# Patient Record
Sex: MALE | URBAN - METROPOLITAN AREA
[De-identification: names, ages, dates, MRNs, and addresses within clinical notes are randomized per-mention and may not be internally consistent; named-entity substitution may affect disease eponyms.]

---

## 2017-02-14 ENCOUNTER — OFFICE VISIT (OUTPATIENT)
Dept: FAMILY MEDICINE | Facility: CLINIC | Age: 42
End: 2017-02-14
Payer: COMMERCIAL

## 2017-02-14 VITALS
DIASTOLIC BLOOD PRESSURE: 69 MMHG | TEMPERATURE: 98.4 F | WEIGHT: 188 LBS | HEART RATE: 60 BPM | SYSTOLIC BLOOD PRESSURE: 110 MMHG | OXYGEN SATURATION: 96 % | HEIGHT: 66 IN | BODY MASS INDEX: 30.22 KG/M2

## 2017-02-14 DIAGNOSIS — S86.011A STRAIN OF RIGHT ACHILLES TENDON, INITIAL ENCOUNTER: Primary | ICD-10-CM

## 2017-02-14 DIAGNOSIS — Z23 NEED FOR PROPHYLACTIC VACCINATION AND INOCULATION AGAINST INFLUENZA: ICD-10-CM

## 2017-02-14 DIAGNOSIS — N52.8 OTHER MALE ERECTILE DYSFUNCTION: ICD-10-CM

## 2017-02-14 DIAGNOSIS — Z23 NEED FOR VACCINATION: ICD-10-CM

## 2017-02-14 PROCEDURE — 90471 IMMUNIZATION ADMIN: CPT | Performed by: FAMILY MEDICINE

## 2017-02-14 PROCEDURE — 90472 IMMUNIZATION ADMIN EACH ADD: CPT | Performed by: FAMILY MEDICINE

## 2017-02-14 PROCEDURE — 90715 TDAP VACCINE 7 YRS/> IM: CPT | Performed by: FAMILY MEDICINE

## 2017-02-14 PROCEDURE — 90686 IIV4 VACC NO PRSV 0.5 ML IM: CPT | Performed by: FAMILY MEDICINE

## 2017-02-14 PROCEDURE — 99214 OFFICE O/P EST MOD 30 MIN: CPT | Mod: 25 | Performed by: FAMILY MEDICINE

## 2017-02-14 RX ORDER — NABUMETONE 500 MG/1
500-1000 TABLET, FILM COATED ORAL 2 TIMES DAILY PRN
Qty: 30 TABLET | Refills: 0 | Status: SHIPPED | OUTPATIENT
Start: 2017-02-14 | End: 2017-02-20

## 2017-02-14 RX ORDER — TADALAFIL 5 MG/1
5 TABLET ORAL DAILY
Qty: 30 TABLET | Refills: 0 | Status: SHIPPED | OUTPATIENT
Start: 2017-02-14

## 2017-02-14 NOTE — PROGRESS NOTES
"  SUBJECTIVE:                                                    Bill Moyer is a 41 year old male who presents to clinic today for the following health issues:      Musculoskeletal problem/Foot pain (R)       Duration: 2 months     Description  Location: right heel/ankle. Patient is complaining of sharp shooting pain stemming from the bottom of the right heel all the way up to his lower leg. Comes on and off, although it is becoming more frequent and noticeable at the day after he is on his feet a lot     Intensity:  moderate    Accompanying signs and symptoms: radiation of pain to ankle and lower leg . No swelling redness etc     History  Previous similar problem: no   Previous evaluation:  none    Precipitating or alleviating factors:  Trauma or overuse: YES  Aggravating factors include: sitting    Therapies tried and outcome: NSAID - not effective    other concerns   Also wants to get refill on Viagra, since he is out. No problem taking med's. Although he thinsk does not work as good so wondering if he can try something different         Problem list and histories reviewed & adjusted, as indicated.  Additional history: as documented    Problem list, Medication list, Allergies, and Medical/Social/Surgical histories reviewed in EPIC and updated as appropriate.    ROS:  Constitutional, HEENT, cardiovascular, pulmonary, GI, , musculoskeletal, neuro, skin, endocrine and psych systems are negative, except as otherwise noted.    OBJECTIVE:                                                    /69 (BP Location: Right arm, Patient Position: Chair, Cuff Size: Adult Large)  Pulse 60  Temp 98.4  F (36.9  C) (Tympanic)  Ht 5' 5.75\" (1.67 m)  Wt 188 lb (85.3 kg)  SpO2 96%  BMI 30.58 kg/m2  Body mass index is 30.58 kg/(m^2).  GENERAL: healthy, alert and no distress  RESP: lungs clear to auscultation - no rales, rhonchi or wheezes  CV: regular rate and rhythm, normal S1 S2, no S3 or S4, no murmur  MS: rt foot " and ankle with no swelling and  normal range of motion, has  tenderness to palpation mostly distal achillis and bottom of his heel . No calf swelling or tenderness   NEURO: Normal strength and tone,          ASSESSMENT/PLAN:                                                      (S86.011A) Strain of right Achilles tendon, initial encounter  (primary encounter diagnosis)  Comment:   Plan: nabumetone (RELAFEN) 500 MG tablet            Discussed feet / ankle cares. Talked about comfortable shoes, orthotics to support etc.. Also gave Relafen to help with pain in the meantime. Pros/ cons of med's discussed follow up if ongoing problem. Consider PT or podiatry referral if needed. He will follow up as needed       (N52.8) Other male erectile dysfunction  Comment:   Plan: tadalafil (CIALIS) 5 MG tablet   he want to rty cilias. Cares and symptomatic treatment discussed follow up if problem         (Z23) Need for vaccination  Comment:   Plan: TDAP (ADACEL AGES 11-64) [90274.002], 1st          Administration  [74331]            (Z23) Need for prophylactic vaccination and inoculation against influenza  Comment:   Plan: FLU VAC, SPLIT VIRUS IM > 3 YO (QUADRIVALENT)         [81738], Each additional admin.  (Right click         and add QUANTITY)  [78813]            Patient expressed understanding and agreement with treatment plan. All patient's questions were answered, will let me know if has more later.  Medications: Rx's: Reviewed the potential side effects/complications of medications prescribed.         Merle Mark MD  [facility] [place]    Injectable Influenza Immunization Documentation    1.  Is the person to be vaccinated sick today?  No    2. Does the person to be vaccinated have an allergy to eggs or to a component of the vaccine?  No    3. Has the person to be vaccinated today ever had a serious reaction to influenza vaccine in the past?  No    4. Has the person to be vaccinated ever had Guillain-Wapiti  syndrome?  No     Form completed by Laurita Woo MA

## 2017-02-14 NOTE — NURSING NOTE
"Chief Complaint   Patient presents with     Musculoskeletal Problem     Right        Initial /69 (BP Location: Right arm, Patient Position: Chair, Cuff Size: Adult Large)  Pulse 60  Temp 98.4  F (36.9  C) (Tympanic)  Ht 5' 5.75\" (1.67 m)  Wt 188 lb (85.3 kg)  SpO2 96%  BMI 30.58 kg/m2 Estimated body mass index is 30.58 kg/(m^2) as calculated from the following:    Height as of this encounter: 5' 5.75\" (1.67 m).    Weight as of this encounter: 188 lb (85.3 kg).  Medication Reconciliation: complete  "

## 2017-02-14 NOTE — MR AVS SNAPSHOT
"              After Visit Summary   2/14/2017    Bill Moyer    MRN: [id number]           Patient Information     Date Of Birth          1975        Visit Information        Provider Department      2/14/2017 4:00 PM Merle Mark MD [facility] [place]        Today's Diagnoses     Strain of right Achilles tendon, initial encounter    -  1    Need for vaccination        Need for prophylactic vaccination and inoculation against influenza        Other male erectile dysfunction          Care Instructions    Take medications as directed.  Treatment  and symptomatic cares discussed   Follow up if problem or concern           Follow-ups after your visit        Follow-up notes from your care team     Return if symptoms worsen or fail to improve.      Who to contact     If you have questions or need follow up information about today's clinic visit or your schedule please contact [facility] [place] directly at [phone].  Normal or non-critical lab and imaging results will be communicated to you by Offerboardhart, letter or phone within 4 business days after the clinic has received the results. If you do not hear from us within 7 days, please contact the clinic through MyChart or phone. If you have a critical or abnormal lab result, we will notify you by phone as soon as possible.  Submit refill requests through [employer] or call your pharmacy and they will forward the refill request to us. Please allow 3 business days for your refill to be completed.          Additional Information About Your Visit        MyChart Information     [employer] lets you send messages to your doctor, view your test results, renew your prescriptions, schedule appointments and more. To sign up, go to [email or web address] . Click on \"Log in\" on the left side of the screen, which will take you to the Welcome page. Then click on \"Sign up Now\" on the right side of the page.     You will be asked to enter the " "access code listed below, as well as some personal information. Please follow the directions to create your username and password.     Your access code is: [email or web address]  Expires: 3/26/2017  5:00 PM     Your access code will  in 90 days. If you need help or a new code, please call your [place] clinic or [phone].        Care EveryWhere ID     This is your Care EveryWhere ID. This could be used by other organizations to access your [place] medical records  [id number]        Your Vitals Were     Pulse Temperature Height Pulse Oximetry BMI (Body Mass Index)       60 98.4  F (36.9  C) (Tympanic) 5' 5.75\" (1.67 m) 96% 30.58 kg/m2        Blood Pressure from Last 3 Encounters:   17 110/69   16 106/68   10/07/14 110/69    Weight from Last 3 Encounters:   17 188 lb (85.3 kg)   16 189 lb 9.6 oz (86 kg)   10/07/14 174 lb (78.9 kg)              We Performed the Following     1st  Administration  [80378]     Each additional admin.  (Right click and add QUANTITY)  [66320]     FLU VAC, SPLIT VIRUS IM > 3 YO (QUADRIVALENT) [49560]     TDAP (ADACEL AGES 11-64) [92452.002]          Today's Medication Changes          These changes are accurate as of: 17  4:56 PM.  If you have any questions, ask your nurse or doctor.               Start taking these medicines.        Dose/Directions    nabumetone 500 MG tablet   Commonly known as:  RELAFEN   Used for:  Strain of right Achilles tendon, initial encounter   Started by:  Merle Mark MD        Dose:  500-1000 mg   Take 1-2 tablets (500-1,000 mg) by mouth 2 times daily as needed   Quantity:  30 tablet   Refills:  0       tadalafil 5 MG tablet   Commonly known as:  CIALIS   Used for:  Other male erectile dysfunction   Started by:  Merle Mark MD        Dose:  5 mg   Take 1 tablet (5 mg) by mouth daily Never use with nitroglycerin, terazosin or doxazosin.   Quantity:  30 tablet   Refills:  0            Where to get your medicines    "   These medications were sent to [facility] [place] - [place], [place] - [address]  [address] [address] [facility], [place] [address]     Phone:  [phone]     nabumetone 500 MG tablet    tadalafil 5 MG tablet                Primary Care Provider    None Specified       No primary provider on file.        Thank you!     [facility] you [facility]. Our goal is always to provide you with excellent care. Hearing back from our patients is one way we can continue to improve our services. Please take a few minutes to complete the written survey that you may receive in the mail after your visit with us. Thank you!             Your Updated Medication List - Protect others around you: Learn how to safely use, store and throw away your medicines at www.disposemymeds.org.          This list is accurate as of: 2/14/17  4:56 PM.  Always use your most recent med list.                   Brand Name Dispense Instructions for use    nabumetone 500 MG tablet    RELAFEN    30 tablet    Take 1-2 tablets (500-1,000 mg) by mouth 2 times daily as needed       sildenafil 100 MG cap/tab    REVATIO/VIAGRA    12 tablet    Take 0.5-1 tablets ( mg) by mouth daily as needed for erectile dysfunction Take 30 min to 4 hours before intercourse.  Never use with nitroglycerin, terazosin or doxazosin.       tadalafil 5 MG tablet    CIALIS    30 tablet    Take 1 tablet (5 mg) by mouth daily Never use with nitroglycerin, terazosin or doxazosin.

## 2017-02-20 ENCOUNTER — OFFICE VISIT (OUTPATIENT)
Dept: FAMILY MEDICINE | Facility: CLINIC | Age: 42
End: 2017-02-20
Payer: COMMERCIAL

## 2017-02-20 VITALS
HEART RATE: 72 BPM | HEIGHT: 66 IN | BODY MASS INDEX: 30.18 KG/M2 | SYSTOLIC BLOOD PRESSURE: 90 MMHG | TEMPERATURE: 96.8 F | OXYGEN SATURATION: 97 % | WEIGHT: 187.8 LBS | DIASTOLIC BLOOD PRESSURE: 70 MMHG

## 2017-02-20 DIAGNOSIS — Z00.00 ROUTINE GENERAL MEDICAL EXAMINATION AT A HEALTH CARE FACILITY: Primary | ICD-10-CM

## 2017-02-20 DIAGNOSIS — Z13.6 CARDIOVASCULAR SCREENING; LDL GOAL LESS THAN 160: ICD-10-CM

## 2017-02-20 DIAGNOSIS — S86.011A STRAIN OF RIGHT ACHILLES TENDON, INITIAL ENCOUNTER: ICD-10-CM

## 2017-02-20 LAB
CHOLEST SERPL-MCNC: 221 MG/DL
GLUCOSE SERPL-MCNC: 112 MG/DL (ref 70–99)
HDLC SERPL-MCNC: 45 MG/DL
LDLC SERPL CALC-MCNC: 145 MG/DL
NONHDLC SERPL-MCNC: 176 MG/DL
TRIGL SERPL-MCNC: 154 MG/DL

## 2017-02-20 PROCEDURE — 36415 COLL VENOUS BLD VENIPUNCTURE: CPT | Performed by: FAMILY MEDICINE

## 2017-02-20 PROCEDURE — 99212 OFFICE O/P EST SF 10 MIN: CPT | Mod: 25 | Performed by: FAMILY MEDICINE

## 2017-02-20 PROCEDURE — 82947 ASSAY GLUCOSE BLOOD QUANT: CPT | Performed by: FAMILY MEDICINE

## 2017-02-20 PROCEDURE — 99396 PREV VISIT EST AGE 40-64: CPT | Performed by: FAMILY MEDICINE

## 2017-02-20 PROCEDURE — 80061 LIPID PANEL: CPT | Performed by: FAMILY MEDICINE

## 2017-02-20 RX ORDER — NABUMETONE 500 MG/1
500-1000 TABLET, FILM COATED ORAL 2 TIMES DAILY PRN
Qty: 30 TABLET | Refills: 0 | Status: SHIPPED | OUTPATIENT
Start: 2017-02-20

## 2017-02-20 NOTE — MR AVS SNAPSHOT
After Visit Summary   2/20/2017    Bill Moyer    MRN: [id number]           Patient Information     Date Of Birth          1975        Visit Information        Provider Department      2/20/2017 7:00 AM Merle Mark MD [facility] [place]        Today's Diagnoses     Routine general medical examination at a health care facility    -  1    CARDIOVASCULAR SCREENING; LDL GOAL LESS THAN 160        Strain of right Achilles tendon, initial encounter        BMI 30.0-30.9,adult          Care Instructions      Preventive Health Recommendations  Male Ages 40 to 49    Yearly exam:             See your health care provider every year in order to  o   Review health changes.   o   Discuss preventive care.    o   Review your medicines if your doctor has prescribed any.    You should be tested each year for STDs (sexually transmitted diseases) if you re at risk.     Have a cholesterol test every 5 years.     Have a colonoscopy (test for colon cancer) if someone in your family has had colon cancer or polyps before age 50.     After age 45, have a diabetes test (fasting glucose). If you are at risk for diabetes, you should have this test every 3 years.      Talk with your health care provider about whether or not a prostate cancer screening test (PSA) is right for you.    Shots: Get a flu shot each year. Get a tetanus shot every 10 years.     Nutrition:    Eat at least 5 servings of fruits and vegetables daily.     Eat whole-grain bread, whole-wheat pasta and brown rice instead of white grains and rice.     Talk to your provider about Calcium and Vitamin D.     Lifestyle    Exercise for at least 150 minutes a week (30 minutes a day, 5 days a week). This will help you control your weight and prevent disease.     Limit alcohol to one drink per day.     No smoking.     Wear sunscreen to prevent skin cancer.     See your dentist every six months for an exam and cleaning.     "  Understanding Body Mass Index (BMI)  Body mass index (BMI) is a ratio of your height to your weight. The BMI is a measure of overweight that is corrected for height. Knowing your BMI is a way of finding whether you are at a healthy weight. The higher your BMI, the greater your risk for weight-related health problems.  What BMI means    BMI below 18.5: Underweight    BMI 18.5 to 24.9: Healthy weight or \"ideal body weight\"     BMI 25 to 29.9: Overweight    BMI 30 and over: Obese    BMI 40 and over: Severe obesity     BMI 50 and over: Super obesity   Using the BMI chart  To figure your BMI, find your height and weight (or the numbers closest to them) on the chart below. Follow each column of numbers to where your height and weight meet on the chart. That is your BMI. You can also calculate your BMI using the formula below.    Using the BMI Formula Example   Multiply your weight in pounds by 703. 160 pounds x 703= 008763   Divide the answer by your height in inches. 473552 ÷ 63 inches= 1785   Divide this number by your height in inches again. This is your BMI. 1785 ÷ 63 inches= BMI of 28     9242-7549 [employer]. [address] [place] [address], [place] [address]. All rights reserved. This information is not intended as a substitute for professional medical care. Always follow your healthcare professional's instructions.        Weight Management: Fact and Fiction  Knowing the truth about losing weight can help you separate what works from what doesn t. Don t be taken in by expensive weight-loss fads like pills, herbs, and special foods that promise unbelievable results. There s no magic way to lose weight. If you have questions about weight loss, ask your health care provider.    Fiction:  The faster I lose weight, the better.   Fact: Rapid weight loss is usually due to loss of water or muscle mass. What you re trying to get rid of is extra fat. Aim to lose a 1/2 pound to 2 pounds a week. Then you re more likely " to lose fat rather than water or muscle.  Fiction:  Skipping meals will help me lose weight.   Fact: When you skip meals, you don t give your body the energy it needs to work. Hunger makes you more likely to overeat later on. It s best to spread your meals throughout the day. Eat at least 3 meals a day.  Fiction:  I can t start exercising until I lose weight.   Fact: The sooner you start exercising the better. Exercise helps burn more calories, tone your muscles, and keep your appetite in check. People who continue to exercise after they lose weight are more likely to keep the weight off.  Fiction:  The fewer calories I eat, the better.   Fact: This seems like it should be true, but it s not. When you eat too few calories, your body acts as if it s on a desert island. It thinks food is scarce, so it slows down your metabolism (how fast you burn calories) to save energy. By eating too few calories, you make it harder to lose weight.  Fiction:  Once I lose weight, I can go back to living the way I did before.   Fact: Going back to your old eating habits and giving up exercise is a sure way to regain any weight you ve lost. The lifestyle changes that help you lose extra weight can also help keep it off. This is why you need to make realistic changes you can stick with.  Fiction:  Low-fat and fat-free mean low-calorie.   Fact: All foods, even fat-free ones, have calories. Eat too many calories and you ll gain weight. It s OK to treat yourself to a fat-free cookie or 2. Just don t eat the whole box!    9220-5035 [employer]. [address] [place] [address]. All rights reserved. This information is not intended as a substitute for professional medical care. Always follow your healthcare professional's instructions.        Weight Management: Healthy Eating  Food is your body s fuel. You can t live without it. The key is to give your body enough nutrients and energy without eating too much. Reading  food labels can help you make healthy choices. Also, learn new eating habits to manage your weight.     All the values on the label are based on one serving. The serving size is the average portion. Remember to multiply the values on the label by the number of servings you eat.   Eat less fat  A gram of fat has almost 2.5 times the calories of a gram of protein or carbohydrates. Try to balance your food choices so that only 20% to 35% of your calories comes from total fat. This means an average of 2  to 3  grams of fat for each 100 calories you eat.  Eat more fiber  High-fiber foods are digested more slowly than low-fiber foods, so you feel full longer. Try to get 31 grams of fiber each day. Foods high in fiber include:    Vegetables and fruits    Whole-grain or bran breads, pastas, and cereals    Legumes (beans) and peas  As you begin to eat more fiber, be sure to drink plenty of water to keep your digestive system working smoothly.  Tips    Don t skip meals. This often leads to overeating later on. It s best to spread your eating throughout the day.    Eat a variety of foods, not just a few favorites.    If you find yourself eating when you re not hungry, ask yourself why. Many of us eat when we re bored, stressed, or just to be polite. Listen to your body. If you re not hungry, get busy doing something else instead of eating.    Eat slower, shooting for 20 to 30 minutes for each meal. It takes 20 minutes for your stomach to tell your brain that it s full.    Pay attention to what you eat. Don t read or watch TV during your meal.    0440-3596 [employer]. [address] [place] [address]. All rights reserved. This information is not intended as a substitute for professional medical care. Always follow your healthcare professional's instructions.              Follow-ups after your visit        Additional Services     [facility] PT, HAND, AND CHIROPRACTIC REFERRAL       **This order will print in the  [facility] Scheduling Office**    Physical Therapy, Hand Therapy and Chiropractic Care are available through:    *[facility]  *[facility]  *[facility]    Call one number to schedule at any of the above locations: [phone].    Your provider has referred you to: Physical Therapy at [facility] or [facility]    Indication/Reason for Referral: Ankle Pain and achilis strain    Onset of Illness: ongoing for 3 months  Therapy Orders: Evaluate and Treat  Special Programs:   Special Request:     Walter Watson      Additional Comments for the Therapist or Chiropractor:     Please be aware that coverage of these services is subject to the terms and limitations of your health insurance plan.  Call member services at your health plan with any benefit or coverage questions.      Please bring the following to your appointment:    *Your personal calendar for scheduling future appointments  *Comfortable clothing                  Who to contact     If you have questions or need follow up information about today's clinic visit or your schedule please contact [facility] directly at [phone].  Normal or non-critical lab and imaging results will be communicated to you by [employer], letter or phone within 4 business days after the clinic has received the results. If you do not hear from us within 7 days, please contact the clinic through [employer] or phone. If you have a critical or abnormal lab result, we will notify you by phone as soon as possible.  Submit refill requests through [employer] or call your pharmacy and they will forward the refill request to us. Please allow 3 business days for your refill to be completed.          Additional Information About Your Visit        [employer] Information     [employer] lets you send messages to your doctor, view your test results, renew your prescriptions, schedule appointments and more. To sign up, go to [email or web address] . Click on  "\"Log in\" on the left side of the screen, which will take you to the Welcome page. Then click on \"Sign up Now\" on the right side of the page.     You will be asked to enter the access code listed below, as well as some personal information. Please follow the directions to create your username and password.     Your access code is: [email or web address]  Expires: 3/26/2017  5:00 PM     Your access code will  in 90 days. If you need help or a new code, please call your [place] clinic or [phone].        Care EveryWhere ID     This is your Care EveryWhere ID. This could be used by other organizations to access your [place] medical records  [id number]        Your Vitals Were     Pulse Temperature Height Pulse Oximetry BMI (Body Mass Index)       72 96.8  F (36  C) (Tympanic) 5' 6\" (1.676 m) 97% 30.31 kg/m2        Blood Pressure from Last 3 Encounters:   17 90/70   17 110/69   16 106/68    Weight from Last 3 Encounters:   17 187 lb 12.8 oz (85.2 kg)   17 188 lb (85.3 kg)   16 189 lb 9.6 oz (86 kg)              We Performed the Following     Glucose     JO[facility] C PT, HAND, AND CHIROPRACTIC REFERRAL     Lipid panel reflex to direct LDL     OFFICE/OUTPT VISIT,EST,LEVL III          Where to get your medicines      These medications were sent to [facility] [place] - [place], [place] [id number] [facility] [address]     Phone:  [phone]     nabumetone 500 MG tablet          Primary Care Provider    None Specified       No primary provider on file.        Thank you!     Thank you for choosing [facility]  for your care. Our goal is always to provide you with excellent care. Hearing back from our patients is one way we can continue to improve our services. Please take a few minutes to complete the written survey that you may receive in the mail after your visit with us. Thank you!             Your Updated Medication List " - Protect others around you: Learn how to safely use, store and throw away your medicines at www.disposemymeds.org.          This list is accurate as of: 2/20/17  7:52 AM.  Always use your most recent med list.                   Brand Name Dispense Instructions for use    nabumetone 500 MG tablet    RELAFEN    30 tablet    Take 1-2 tablets (500-1,000 mg) by mouth 2 times daily as needed       tadalafil 5 MG tablet    CIALIS    30 tablet    Take 1 tablet (5 mg) by mouth daily Never use with nitroglycerin, terazosin or doxazosin.

## 2017-02-20 NOTE — NURSING NOTE
"Chief Complaint   Patient presents with     Physical     fasting       Initial There were no vitals taken for this visit. Estimated body mass index is 30.58 kg/(m^2) as calculated from the following:    Height as of 2/14/17: 5' 5.75\" (1.67 m).    Weight as of 2/14/17: 188 lb (85.3 kg).  Medication Reconciliation: complete  "

## 2017-02-20 NOTE — PATIENT INSTRUCTIONS
"  Preventive Health Recommendations  Male Ages 40 to 49    Yearly exam:             See your health care provider every year in order to  o   Review health changes.   o   Discuss preventive care.    o   Review your medicines if your doctor has prescribed any.    You should be tested each year for STDs (sexually transmitted diseases) if you re at risk.     Have a cholesterol test every 5 years.     Have a colonoscopy (test for colon cancer) if someone in your family has had colon cancer or polyps before age 50.     After age 45, have a diabetes test (fasting glucose). If you are at risk for diabetes, you should have this test every 3 years.      Talk with your health care provider about whether or not a prostate cancer screening test (PSA) is right for you.    Shots: Get a flu shot each year. Get a tetanus shot every 10 years.     Nutrition:    Eat at least 5 servings of fruits and vegetables daily.     Eat whole-grain bread, whole-wheat pasta and brown rice instead of white grains and rice.     Talk to your provider about Calcium and Vitamin D.     Lifestyle    Exercise for at least 150 minutes a week (30 minutes a day, 5 days a week). This will help you control your weight and prevent disease.     Limit alcohol to one drink per day.     No smoking.     Wear sunscreen to prevent skin cancer.     See your dentist every six months for an exam and cleaning.      Understanding Body Mass Index (BMI)  Body mass index (BMI) is a ratio of your height to your weight. The BMI is a measure of overweight that is corrected for height. Knowing your BMI is a way of finding whether you are at a healthy weight. The higher your BMI, the greater your risk for weight-related health problems.  What BMI means    BMI below 18.5: Underweight    BMI 18.5 to 24.9: Healthy weight or \"ideal body weight\"     BMI 25 to 29.9: Overweight    BMI 30 and over: Obese    BMI 40 and over: Severe obesity     BMI 50 and over: Super obesity   Using the BMI " chart  To figure your BMI, find your height and weight (or the numbers closest to them) on the chart below. Follow each column of numbers to where your height and weight meet on the chart. That is your BMI. You can also calculate your BMI using the formula below.    Using the BMI Formula Example   Multiply your weight in pounds by 703. 160 pounds x 703= 083080   Divide the answer by your height in inches. 862582 ÷ 63 inches= 1785   Divide this number by your height in inches again. This is your BMI. 1785 ÷ 63 inches= BMI of 28     7901-6027 [employer]. [address] [place] [address], [place] [address]. All rights reserved. This information is not intended as a substitute for professional medical care. Always follow your healthcare professional's instructions.        Weight Management: Fact and Fiction  Knowing the truth about losing weight can help you separate what works from what doesn t. Don t be taken in by expensive weight-loss fads like pills, herbs, and special foods that promise unbelievable results. There s no magic way to lose weight. If you have questions about weight loss, ask your health care provider.    Fiction:  The faster I lose weight, the better.   Fact: Rapid weight loss is usually due to loss of water or muscle mass. What you re trying to get rid of is extra fat. Aim to lose a 1/2 pound to 2 pounds a week. Then you re more likely to lose fat rather than water or muscle.  Fiction:  Skipping meals will help me lose weight.   Fact: When you skip meals, you don t give your body the energy it needs to work. Hunger makes you more likely to overeat later on. It s best to spread your meals throughout the day. Eat at least 3 meals a day.  Fiction:  I can t start exercising until I lose weight.   Fact: The sooner you start exercising the better. Exercise helps burn more calories, tone your muscles, and keep your appetite in check. People who continue to exercise after they lose weight are more  likely to keep the weight off.  Fiction:  The fewer calories I eat, the better.   Fact: This seems like it should be true, but it s not. When you eat too few calories, your body acts as if it s on a desert island. It thinks food is scarce, so it slows down your metabolism (how fast you burn calories) to save energy. By eating too few calories, you make it harder to lose weight.  Fiction:  Once I lose weight, I can go back to living the way I did before.   Fact: Going back to your old eating habits and giving up exercise is a sure way to regain any weight you ve lost. The lifestyle changes that help you lose extra weight can also help keep it off. This is why you need to make realistic changes you can stick with.  Fiction:  Low-fat and fat-free mean low-calorie.   Fact: All foods, even fat-free ones, have calories. Eat too many calories and you ll gain weight. It s OK to treat yourself to a fat-free cookie or 2. Just don t eat the whole box!    1999-9597 [employer]. [address] [place] [address]. All rights reserved. This information is not intended as a substitute for professional medical care. Always follow your healthcare professional's instructions.        Weight Management: Healthy Eating  Food is your body s fuel. You can t live without it. The key is to give your body enough nutrients and energy without eating too much. Reading food labels can help you make healthy choices. Also, learn new eating habits to manage your weight.     All the values on the label are based on one serving. The serving size is the average portion. Remember to multiply the values on the label by the number of servings you eat.   Eat less fat  A gram of fat has almost 2.5 times the calories of a gram of protein or carbohydrates. Try to balance your food choices so that only 20% to 35% of your calories comes from total fat. This means an average of 2  to 3  grams of fat for each 100 calories you eat.  Eat more  fiber  High-fiber foods are digested more slowly than low-fiber foods, so you feel full longer. Try to get 31 grams of fiber each day. Foods high in fiber include:    Vegetables and fruits    Whole-grain or bran breads, pastas, and cereals    Legumes (beans) and peas  As you begin to eat more fiber, be sure to drink plenty of water to keep your digestive system working smoothly.  Tips    Don t skip meals. This often leads to overeating later on. It s best to spread your eating throughout the day.    Eat a variety of foods, not just a few favorites.    If you find yourself eating when you re not hungry, ask yourself why. Many of us eat when we re bored, stressed, or just to be polite. Listen to your body. If you re not hungry, get busy doing something else instead of eating.    Eat slower, shooting for 20 to 30 minutes for each meal. It takes 20 minutes for your stomach to tell your brain that it s full.    Pay attention to what you eat. Don t read or watch TV during your meal.    9551-8352 [employer]. [address] [place] [address], [place] [address]. All rights reserved. This information is not intended as a substitute for professional medical care. Always follow your healthcare professional's instructions.

## 2017-02-20 NOTE — PROGRESS NOTES
SUBJECTIVE:     CC: Bill Moyer is an 41 year old male who presents for preventative health visit.     Healthy Habits:    Do you get at least three servings of calcium containing foods daily (dairy, green leafy vegetables, etc.)? 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 no    Do you see a dentist twice per year? no    Do you have sleep apnea, excessive snoring or daytime drowsiness?no/ possibly         PROBLEMS TO ADD ON...  Has ongoing rt achilis pain x 3 months . He tried relefan with minimal help. Feet are still cramping , feels pain is worse. Out of relafen , but concerned with ongoing pain, affecting work bc he has to walk a lot     Today's PHQ-2 Score:   PHQ-2 ( 1999 [employer]) 2/14/2017 10/7/2014   Q1: Little interest or pleasure in doing things 0 0   Q2: Feeling down, depressed or hopeless 0 1   PHQ-2 Score 0 1       Abuse: Current or Past(Physical, Sexual or Emotional)- NO  Do you feel safe in your environment - YES    Social History   Substance Use Topics     Smoking status: Never Smoker     Smokeless tobacco: Never Used     Alcohol use No      Comment: sober [id number]     The patient does not drink >3 drinks per day nor >7 drinks per week.    Last PSA: No results found for: PSA    No results for input(s): CHOL, HDL, LDL, TRIG, CHOLHDLRATIO, NHDL in the last 33959 hours.    Reviewed orders with patient. Reviewed health maintenance and updated orders accordingly - Yes    All Histories reviewed and updated in Epic.  No past medical history on file.     ROS:  C: NEGATIVE for fever, chills, change in weight  I: NEGATIVE for worrisome rashes, moles or lesions  E: NEGATIVE for vision changes or irritation  ENT: NEGATIVE for ear, mouth and throat problems  R: NEGATIVE for significant cough or SOB  CV: NEGATIVE  for chest pain, palpitations or peripheral edema  GI: NEGATIVE for nausea, abdominal pain, heartburn, or change in bowel habits   male: negative for dysuria, hematuria, decreased urinary stream, erectile dysfunction, urethral discharge  MUSCULOSKELETAL:as per HPI   N: NEGATIVE for weakness, dizziness or paresthesias  P: NEGATIVE for changes in mood or affect    Problem list, Medication list, Allergies, and Medical/Social/Surgical histories reviewed in EPIC and updated as appropriate.  OBJECTIVE:     There were no vitals taken for this visit.  EXAM:  GENERAL: healthy, alert and no distress  EYES: Eyes grossly normal to inspection, PERRL and conjunctivae and sclerae normal  HENT: ear canals and TM's normal, nose and mouth without ulcers or lesions  NECK: no adenopathy, no asymmetry, masses, or scars and thyroid normal to palpation  RESP: lungs clear to auscultation - no rales, rhonchi or wheezes  CV: regular rate and rhythm, normal S1 S2, no S3 or S4, no murmur, click or rub, no peripheral edema and peripheral pulses strong  ABDOMEN: soft, nontender, no hepatosplenomegaly, no masses and bowel sounds normal   (male): testicles normal without atrophy or masses, no hernias and penis normal without urethral discharge  MS: rt foot/ ankle wit normal range of motion, no edema and tenderness to palpation at the achillis/ heel area   SKIN: no suspicious lesions or rashes  NEURO: Normal strength and tone, mentation intact and speech normal  PSYCH: mentation appears normal, affect normal/bright    ASSESSMENT/PLAN:     (Z00.00) Routine general medical examination at a health care facility  (primary encounter diagnosis)  Comment:   Plan: Lipid panel reflex to direct LDL, Glucose            (Z13.6) CARDIOVASCULAR SCREENING; LDL GOAL LESS THAN 160  Comment:   Plan: Lipid panel reflex to direct LDL            (S86.011A) Strain of right Achilles tendon, initial encounter  Comment:   Plan: [facility] PT, HAND, AND CHIROPRACTIC REFERRAL     "       discussed back and neck cares and symptomatic treatment including  adequate pain control,  stretches etc. Gave referral to   physical therapy        he will do follow up if no improvement or problem. Consider further evaluation if needed.     (Z68.30) BMI 30.0-30.9,adult  Comment:   Plan: Healthy diet and exercise reviewed.  Limit pop and juice intake.  Risks of obesity discussed.  Encourage exercise.        COUNSELING:  Reviewed preventive health counseling, as reflected in patient instructions       Regular exercise       Healthy diet/nutrition       Vision screening         reports that he has never smoked. He has never used smokeless tobacco.    Estimated body mass index is 30.58 kg/(m^2) as calculated from the following:    Height as of 2/14/17: 5' 5.75\" (1.67 m).    Weight as of 2/14/17: 188 lb (85.3 kg).   Weight management plan: Discussed healthy diet and exercise guidelines and patient will follow up in 12 months in clinic to re-evaluate.      HCM issues discussed. Diet/ exercise discussed. Check labs , call patiens with results. follow up as needed.       Counseling Resources:  ATP IV Guidelines  Pooled Cohorts Equation Calculator  FRAX Risk Assessment  ICSI Preventive Guidelines  Dietary Guidelines for Americans, 2010  USDA's MyPlate  ASA Prophylaxis  Lung CA Screening    Merle Mark MD  [facility]    "

## 2017-02-21 ENCOUNTER — THERAPY VISIT (OUTPATIENT)
Dept: PHYSICAL THERAPY | Facility: CLINIC | Age: 42
End: 2017-02-21
Payer: COMMERCIAL

## 2017-02-21 DIAGNOSIS — M76.821 TIBIALIS POSTERIOR TENDONITIS, RIGHT: ICD-10-CM

## 2017-02-21 DIAGNOSIS — S86.011A STRAIN OF RIGHT ACHILLES TENDON, INITIAL ENCOUNTER: Primary | ICD-10-CM

## 2017-02-21 PROBLEM — M76.61 ACHILLES TENDINITIS OF RIGHT LOWER EXTREMITY: Status: ACTIVE | Noted: 2017-02-21

## 2017-02-21 PROCEDURE — 97035 APP MDLTY 1+ULTRASOUND EA 15: CPT | Mod: GP | Performed by: PHYSICAL THERAPIST

## 2017-02-21 PROCEDURE — 97110 THERAPEUTIC EXERCISES: CPT | Mod: GP | Performed by: PHYSICAL THERAPIST

## 2017-02-21 PROCEDURE — 97161 PT EVAL LOW COMPLEX 20 MIN: CPT | Mod: GP | Performed by: PHYSICAL THERAPIST

## 2017-02-21 NOTE — PROGRESS NOTES
[facility] Initial Evaluation    Subjective:    Bill Moyer is a 41 year old male with a right ankle condition.  Condition occurred with:  Insidious onset.  Condition occurred: for unknown reasons.  This is a new condition  Patient noted R posteromedial ankle pain about 12/21/16 without specific cause. .    Site of Pain: medial near posterior tibialis tendon/Achilles.    Pain is described as aching, shooting and sharp and is intermittent and reported as 8/10.  Associated with: denies symptoms other than pain. Pain is the same all the time.  Symptoms are exacerbated by walking, ascending stairs, descending stairs and standing (does still have pain sitting and lying down as well) and relieved by NSAID's and analgesics.  Since onset symptoms are gradually worsening.  Special testing: none.  Previous treatment: none.    General health as reported by patient is good.  Pertinent medical history includes:  Overweight.  Medical allergies: no.  Other surgeries include:  None reported.  Current medications:  Anti-inflammatory and pain medication.  Current occupation is Maintenance.  Patient is working in normal job without restrictions.  Primary job tasks include:  Lifting (pushing/pulling).    Barriers include:  None as reported by patient.    Red flags:  None as reported by patient.                      Objective:  ANKLE:     PROM L PROM R AROM L AROM R MMT L MMT R   Dorsiflexion 25 15 WNL WNL 5 5   Plantarflexion 50 50 WNL WNL 5 5   Inversion   WNL WNL 5 5   Eversion   WNL WNL 5 5   G Toe Ext         G Toe Flex           Edema:    General: nil    Palpation: not tender to palpation currently at rest.  With standing gastroc stretch, he points to tibialis posterior rather than Achilles as point where pain is reproduced that he typically experiences.    Gait: note B foot pronation in standing L>R.     Special Tests:   L R   Anterior Drawer  negative   Posterior Drawer  negative   Valgus Stress   negative   Varus Stress  negative   External Rotation  negative   Segal's (Achilles)  negative   Ashutosh's           System    Physical Exam    General     ROS    Assessment/Plan:      Patient is a 41 year old male with right side ankle complaints.    Patient has the following significant findings with corresponding treatment plan.                Diagnosis 1:  R posterior tibialis tendonitis and achilles tendinopathy    Pain -  hot/cold therapy, US, electric stimulation, directional preference exercise and home program  Decreased ROM/flexibility - manual therapy and therapeutic exercise  Decreased strength - therapeutic exercise and therapeutic activities  Impaired balance - neuro re-education and therapeutic activities  Decreased proprioception - neuro re-education and therapeutic activities  Edema - electric stimulation and cold therapy  Impaired gait - gait training  Decreased function - therapeutic activities    Therapy Evaluation Codes:   1) History comprised of:   Personal factors that impact the plan of care:      None.    Comorbidity factors that impact the plan of care are:      None.     Medications impacting care: Anti-inflammatory and Pain.  2) Examination of Body Systems comprised of:   Body structures and functions that impact the plan of care:      Ankle.   Activity limitations that impact the plan of care are:      Driving, Running, Squatting/kneeling, Stairs, Walking and Working.  3) Clinical presentation characteristics are:   Stable/Uncomplicated.  4) Decision-Making    Low complexity using standardized patient assessment instrument and/or measureable assessment of functional outcome.  Cumulative Therapy Evaluation is: Low complexity.    Previous and current functional limitations:  (See Goal Flow Sheet for this information)    Short term and Long term goals: (See Goal Flow Sheet for this information)     Communication ability:  Patient appears to be able to clearly communicate and understand  verbal and written communication and follow directions correctly.  Treatment Explanation - The following has been discussed with the patient:   RX ordered/plan of care  Anticipated outcomes  Possible risks and side effects  This patient would benefit from PT intervention to resume normal activities.   Rehab potential is good.    Frequency:  1 X week, once daily  Duration:  for 6 weeks  Discharge Plan:  Achieve all LTG.  Independent in home treatment program.  Reach maximal therapeutic benefit.    Please refer to the daily flowsheet for treatment today, total treatment time and time spent performing 1:1 timed codes.

## 2017-02-21 NOTE — MR AVS SNAPSHOT
"              After Visit Summary   2/21/2017    Bill Moyer    MRN: [id number]           Patient Information     Date Of Birth          1975        Visit Information        Provider Department      2/21/2017 5:00 PM Dionisio Handy, PT [facility] Physical Therapy        Today's Diagnoses     Strain of right Achilles tendon, initial encounter    -  1    Tibialis posterior tendonitis, right           Follow-ups after your visit        Your next 10 appointments already scheduled     Mar 01, 2017  6:10 PM CST   [facility] C Extremity with Lianet Bonds PTA   [facility]    [address]  [facility] [id number] [phone]              Who to contact     If you have questions or need follow up information about today's clinic visit or your schedule please contact [facility] directly at [phone].  Normal or non-critical lab and imaging results will be communicated to you by Edxacthart, letter or phone within 4 business days after the clinic has received the results. If you do not hear from us within 7 days, please contact the clinic through Edxacthart or phone. If you have a critical or abnormal lab result, we will notify you by phone as soon as possible.  Submit refill requests through [email or web address] or call your pharmacy and they will forward the refill request to us. Please allow 3 business days for your refill to be completed.          Additional Information About Your Visit        [employer] Information     [email or web address] lets you send messages to your doctor, view your test results, renew your prescriptions, schedule appointments and more. To sign up, go to [email or web address] . Click on \"Log in\" on the left side of the screen, which will take you to the Welcome page. Then click on \"Sign up Now\" on the right side of the page. "     You will be asked to enter the access code listed below, as well as some personal information. Please follow the directions to create your username and password.     Your access code is: [email or web address]  Expires: 3/26/2017  5:00 PM     Your access code will  in 90 days. If you need help or a new code, please call your [facility] clinic or [phone].        Care EveryWhere ID     This is your Care EveryWhere ID. This could be used by other organizations to access your [facility] medical records  [id number]         Blood Pressure from Last 3 Encounters:   17 90/70   17 110/69   16 106/68    Weight from Last 3 Encounters:   17 85.2 kg (187 lb 12.8 oz)   17 85.3 kg (188 lb)   16 86 kg (189 lb 9.6 oz)              We Performed the Following     HC PT EVAL, LOW COMPLEXITY     [facility] INITIAL EVAL REPORT     THERAPEUTIC EXERCISES     ULTRASOUND THERAPY        Primary Care Provider    None Specified       No primary provider on file.        Thank you!     Thank you for choosing [facility]  for your care. Our goal is always to provide you with excellent care. Hearing back from our patients is one way we can continue to improve our services. Please take a few minutes to complete the written survey that you may receive in the mail after your visit with us. Thank you!             Your Updated Medication List - Protect others around you: Learn how to safely use, store and throw away your medicines at www.disposemymeds.org.          This list is accurate as of: 17  7:32 PM.  Always use your most recent med list.                   Brand Name Dispense Instructions for use    nabumetone 500 MG tablet    RELAFEN    30 tablet    Take 1-2 tablets (500-1,000 mg) by mouth 2 times daily as needed       tadalafil 5 MG tablet    CIALIS    30 tablet    Take 1 tablet (5 mg) by mouth daily Never use with nitroglycerin, terazosin or doxazosin.

## 2017-02-21 NOTE — LETTER
[facility]TH[facility]IC [facility] PHYSICAL THERAPY  [address]  [place] [facility] [address]  [phone]    2017    Re: Bill Moyer   :   1975  MRN:  [id number]   REFERRING PHYSICIAN:   Merle Mark    [facility] PHYSICAL [facility]  Date of Initial Evaluation:  2017  Visits:  Rxs Used: 1  Reason for Referral:     Strain of right Achilles tendon, initial encounter  Tibialis posterior tendonitis, right    EVALUATION SUMMARY    Subjective:  Bill Moyer is a 41 year old male with a right ankle condition.  Condition occurred with:  Insidious onset.  Condition occurred: for unknown reasons.  This is a new condition  Patient noted R posteromedial ankle pain about 16 without specific cause.Site of Pain: medial near posterior tibialis tendon/Achilles.    Pain is described as aching, shooting and sharp and is intermittent and reported as 8/10.  Associated with: denies symptoms other than pain. Pain is the same all the time.  Symptoms are exacerbated by walking, ascending stairs, descending stairs and standing (does still have pain sitting and lying down as well) and relieved by NSAID's and analgesics.  Since onset symptoms are gradually worsening.  Special testing: none.  Previous treatment: none.    General health as reported by patient is good.  Pertinent medical history includes:  Overweight.  Medical allergies: no.  Other surgeries include:  None reported.  Current medications:  Anti-inflammatory and pain medication.  Current occupation is Maintenance.  Patient is working in normal job without restrictions.  Primary job tasks include:  Lifting (pushing/pulling).  Barriers include:  None as reported by patient.  Red flags:  None as reported by patient.                  Objective:  ANKLE:   PROM L PROM R AROM L AROM R MMT L MMT R   Dorsiflexion 25 15 WNL WNL 5 5   Plantarflexion 50 50 WNL  WNL 5 5   Inversion   WNL WNL 5 5   Eversion   WNL WNL 5 5   G Toe Ext         G Toe Flex         Edema:    General: nil  Palpation: not tender to palpation currently at rest.  With standing gastroc stretch, he points to tibialis posterior rather than Achilles as point where pain is reproduced that he typically experiences.  Gait: note B foot pronation in standing L>R.   Special Tests:   L R   Anterior Drawer  negative   Posterior Drawer  negative   Valgus Stress  negative   Varus Stress  negative   External Rotation  negative   Segal's (Achilles)  negative   Ashutosh's       Assessment/Plan:    Patient is a 41 year old male with right side ankle complaints.    Patient has the following significant findings with corresponding treatment plan.                Diagnosis 1:  R posterior tibialis tendonitis and achilles tendinopathy    Pain -  hot/cold therapy, US, electric stimulation, directional preference exercise and home program  Decreased ROM/flexibility - manual therapy and therapeutic exercise  Decreased strength - therapeutic exercise and therapeutic activities  Impaired balance - neuro re-education and therapeutic activities  Decreased proprioception - neuro re-education and therapeutic activities  Edema - electric stimulation and cold therapy  Impaired gait - gait training  Decreased function - therapeutic activities    Therapy Evaluation Codes:   1) History comprised of:   Personal factors that impact the plan of care:      None.    Comorbidity factors that impact the plan of care are:      None.     Medications impacting care: Anti-inflammatory and Pain.  2) Examination of Body Systems comprised of:   Body structures and functions that impact the plan of care:      Ankle.   Activity limitations that impact the plan of care are:      Driving, Running, Squatting/kneeling, Stairs, Walking and Working.  3) Clinical presentation characteristics are:   Stable/Uncomplicated.  4) Decision-Making                          low complexity using standardized patient assessment instrument and/or measureable assessment of functional outcome.    Cumulative Therapy Evaluation is: Low complexity.  Previous and current functional limitations:  (See Goal Flow Sheet for this information)    Short term and Long term goals: (See Goal Flow Sheet for this information)   Re: Bill Moyer   :   1975    Communication ability:  Patient appears to be able to clearly communicate and understand verbal and written communication and follow directions correctly.  Treatment Explanation - The following has been discussed with the patient:   RX ordered/plan of care  Anticipated outcomes  Possible risks and side effects  This patient would benefit from PT intervention to resume normal activities.   Rehab potential is good.    Frequency:  1 X week, once daily  Duration:  for 6 weeks  Discharge Plan:  Achieve all LTG.  Independent in home treatment program.  Reach maximal therapeutic benefit.    Thank you for your referral.    INQUIRIES  Therapist: Edison Handy DPT   [facility]  [address] [address]  Phone: [phone]  Fax: [phone]

## 2017-03-01 ENCOUNTER — THERAPY VISIT (OUTPATIENT)
Dept: PHYSICAL THERAPY | Facility: CLINIC | Age: 42
End: 2017-03-01
Payer: COMMERCIAL

## 2017-03-01 DIAGNOSIS — M76.61 ACHILLES TENDINITIS OF RIGHT LOWER EXTREMITY: ICD-10-CM

## 2017-03-01 DIAGNOSIS — M76.821 TIBIALIS POSTERIOR TENDONITIS, RIGHT: ICD-10-CM

## 2017-03-01 PROCEDURE — 97140 MANUAL THERAPY 1/> REGIONS: CPT | Mod: GP | Performed by: PHYSICAL THERAPY ASSISTANT

## 2017-03-01 PROCEDURE — 97110 THERAPEUTIC EXERCISES: CPT | Mod: GP | Performed by: PHYSICAL THERAPY ASSISTANT

## 2017-03-01 PROCEDURE — 97035 APP MDLTY 1+ULTRASOUND EA 15: CPT | Mod: GP | Performed by: PHYSICAL THERAPY ASSISTANT

## 2017-03-01 NOTE — MR AVS SNAPSHOT
"              After Visit Summary   3/1/2017    Bill Moyer    MRN: [id number]           Patient Information     Date Of Birth          1975        Visit Information        Provider Department      3/1/2017 6:10 PM Lianet Bonds [facility]        Today's Diagnoses     Achilles tendinitis of right lower extremity        Tibialis posterior tendonitis, right           Follow-ups after your visit        Your next 10 appointments already scheduled     Mar 15, 2017  6:10 PM CDT   JOSE C Extremity with Lianet Bonds [facility] ([facility])    [address]  [facility] [id number] [phone]              Who to contact     If you have questions or need follow up information about today's clinic visit or your schedule please contact [facility] ATHLETIC [facility] PH[facility]ICAL [facility] directly at [phone].  Normal or non-critical lab and imaging results will be communicated to you by [employer], letter or phone within 4 business days after the clinic has received the results. If you do not hear from us within 7 days, please contact the clinic through [employer] or phone. If you have a critical or abnormal lab result, we will notify you by phone as soon as possible.  Submit refill requests through [employer] or call your pharmacy and they will forward the refill request to us. Please allow 3 business days for your refill to be completed.          Additional Information About Your Visit        Web [employer] Information     [employer] lets you send messages to your doctor, view your test results, renew your prescriptions, schedule appointments and more. To sign up, go to [email or web address] . Click on \"Log in\" on the left side of the screen, which will take you to the Welcome page. Then click on \"Sign up Now\" on the right side of the page.     You " will be asked to enter the access code listed below, as well as some personal information. Please follow the directions to create your username and password.     Your access code is: [email or web address]  Expires: 3/26/2017  5:00 PM     Your access code will  in 90 days. If you need help or a new code, please call your [facility] clinic or [phone].        Care EveryWhere ID     This is your Care EveryWhere ID. This could be used by other organizations to access your [facility] medical records  [id number]         Blood Pressure from Last 3 Encounters:   17 90/70   17 110/69   16 106/68    Weight from Last 3 Encounters:   17 85.2 kg (187 lb 12.8 oz)   17 85.3 kg (188 lb)   16 86 kg (189 lb 9.6 oz)              We Performed the Following     MANUAL THER TECH,1+REGIONS,EA 15 MIN     THERAPEUTIC EXERCISES     ULTRASOUND THERAPY        Primary Care Provider    None Specified       No primary provider on file.        Thank you!     Thank you for [facility] PHYSICAL THERAP[facility]  for your care. Our goal is always to provide you with excellent care. Hearing back from our patients is one way we can continue to improve our services. Please take a few minutes to complete the written survey that you may receive in the mail after your visit with us. Thank you!             Your Updated Medication List - Protect others around you: Learn how to safely use, store and throw away your medicines at www.disposemymeds.org.          This list is accurate as of: 3/1/17  7:20 PM.  Always use your most recent med list.                   Brand Name Dispense Instructions for use    nabumetone 500 MG tablet    RELAFEN    30 tablet    Take 1-2 tablets (500-1,000 mg) by mouth 2 times daily as needed       tadalafil 5 MG tablet    CIALIS    30 tablet    Take 1 tablet (5 mg) by mouth daily Never use with nitroglycerin, terazosin or doxazosin.

## 2017-05-01 PROBLEM — M76.61 ACHILLES TENDINITIS OF RIGHT LOWER EXTREMITY: Status: RESOLVED | Noted: 2017-02-21 | Resolved: 2017-05-01

## 2017-05-01 PROBLEM — M76.821 TIBIALIS POSTERIOR TENDONITIS, RIGHT: Status: RESOLVED | Noted: 2017-02-21 | Resolved: 2017-05-01

## 2017-05-01 NOTE — PROGRESS NOTES
"Subjective:    HPI                    Objective:    System    Physical Exam    General     ROS    Assessment/Plan:      DISCHARGE REPORT    Progress reporting period is from 2/21/17 to 3/1/17 (2 visits).       SUBJECTIVE  Subjective changes noted by patient:  Pain has decreased, current symptoms occur when driving and holding foot on pedal. Symptoms described as intermittent \"needle\" sensation. Fair consistency with HEP. Pt considering purchasing orthotics.     Current pain level is: 0/10 (up to 4/10 on occasion ).     Previous pain level was: 8/10.   Changes in function:  Yes (See Goal flowsheet attached for changes in current functional level)  Adverse reaction to treatment or activity: None    OBJECTIVE  Changes noted in objective findings:  Yes,   Objective: R SLS eo 30 seconds. Mild tenderness along distal 1/3 pos tib tendon.      ASSESSMENT/PLAN  Updated problem list and treatment plan: Diagnosis 1:  R posterior tibialis tendonitis > achilles tendinopathy   Progress toward STG/LTGs have been made:  See Goal flow sheet completed today.  Assessment of Progress: Patient did not return to therapy. Current status is unknown.  Self Management Plans:  Patient has been instructed in a home treatment program.  Patient continues to require the following intervention to meet STG and LT's:  Patient did not return to therapy.  PT services will be discontinued.     Recommendations:  Patient will be discharged from physical therapy.         Please refer to the daily flowsheet for treatment today, total treatment time and time spent performing 1:1 timed codes.                "